# Patient Record
Sex: MALE | Race: WHITE | NOT HISPANIC OR LATINO | Employment: OTHER | URBAN - METROPOLITAN AREA
[De-identification: names, ages, dates, MRNs, and addresses within clinical notes are randomized per-mention and may not be internally consistent; named-entity substitution may affect disease eponyms.]

---

## 2019-01-04 ENCOUNTER — OFFICE VISIT (OUTPATIENT)
Dept: URGENT CARE | Facility: CLINIC | Age: 74
End: 2019-01-04
Payer: MEDICARE

## 2019-01-04 VITALS
WEIGHT: 177 LBS | OXYGEN SATURATION: 100 % | DIASTOLIC BLOOD PRESSURE: 80 MMHG | RESPIRATION RATE: 18 BRPM | HEART RATE: 81 BPM | HEIGHT: 67 IN | BODY MASS INDEX: 27.78 KG/M2 | SYSTOLIC BLOOD PRESSURE: 155 MMHG | TEMPERATURE: 98.3 F

## 2019-01-04 DIAGNOSIS — J01.10 ACUTE NON-RECURRENT FRONTAL SINUSITIS: Primary | ICD-10-CM

## 2019-01-04 DIAGNOSIS — K21.9 GASTROESOPHAGEAL REFLUX DISEASE WITHOUT ESOPHAGITIS: ICD-10-CM

## 2019-01-04 PROCEDURE — 99203 OFFICE O/P NEW LOW 30 MIN: CPT | Performed by: PHYSICIAN ASSISTANT

## 2019-01-04 RX ORDER — LISINOPRIL 5 MG/1
5 TABLET ORAL DAILY
Refills: 1 | COMMUNITY
Start: 2018-12-04

## 2019-01-04 RX ORDER — AMOXICILLIN AND CLAVULANATE POTASSIUM 875; 125 MG/1; MG/1
1 TABLET, FILM COATED ORAL EVERY 12 HOURS SCHEDULED
Qty: 14 TABLET | Refills: 0 | Status: SHIPPED | OUTPATIENT
Start: 2019-01-04 | End: 2019-01-11

## 2019-01-04 RX ORDER — RANITIDINE 150 MG/1
150 TABLET ORAL 2 TIMES DAILY
Qty: 30 TABLET | Refills: 0 | Status: SHIPPED | OUTPATIENT
Start: 2019-01-04

## 2019-01-04 RX ORDER — ATORVASTATIN CALCIUM 20 MG/1
20 TABLET, FILM COATED ORAL
Refills: 0 | COMMUNITY
Start: 2018-12-05

## 2019-01-04 RX ORDER — BENZONATATE 200 MG/1
200 CAPSULE ORAL 3 TIMES DAILY PRN
Qty: 20 CAPSULE | Refills: 0 | Status: SHIPPED | OUTPATIENT
Start: 2019-01-04

## 2019-01-04 RX ORDER — GLIMEPIRIDE 4 MG/1
4 TABLET ORAL 2 TIMES DAILY
Refills: 1 | COMMUNITY
Start: 2018-12-05

## 2019-01-04 RX ORDER — RANITIDINE 150 MG/1
150 TABLET ORAL 2 TIMES DAILY
Refills: 0 | COMMUNITY
Start: 2018-10-02 | End: 2019-01-04 | Stop reason: SDUPTHER

## 2019-01-04 NOTE — PATIENT INSTRUCTIONS
Take the antibiotic as directed with food and water  While on this medication begin a probiotic such as yogurt  Take Tessalon as directed  Take ranitidine twice daily as directed  Use a cool mist humidifier at bedtime, turning on hours prior to bed with your bedroom door shut for maximum relief  Follow up with your family doctor in 3-5 days  Proceed to the ER if symptoms worsen

## 2019-01-04 NOTE — PROGRESS NOTES
[id number] [facility]        NAME: Yaz Puentes is a 68 y o  male  : 1945    MRN: [id number]  DATE: 2019  TIME: 6:24 PM    Assessment and Plan   Acute non-recurrent frontal sinusitis [J01 10]  1  Acute non-recurrent frontal sinusitis  amoxicillin-clavulanate (AUGMENTIN) 875-125 mg per tablet    benzonatate (TESSALON) 200 MG capsule   2  Gastroesophageal reflux disease without esophagitis  ranitidine (ZANTAC) 150 mg tablet     Patient Instructions   Take the antibiotic as directed with food and water  While on this medication begin a probiotic such as yogurt  Take Tessalon as directed  Use a cool mist humidifier at bedtime, turning on hours prior to bed with your bedroom door shut for maximum relief  Follow up with your family doctor in 3-5 days  Proceed to the ER if symptoms worsen  Diagnosis, etiology, expected course of illness, and treatment plan reviewed in length  All questions answered  Precautions given  Patient verbalized understanding and agreed with the treatment plan  Chief Complaint     Chief Complaint   Patient presents with    URI     Pt reports of worsening URI s/s  started approx 2 weeks ago  History of Present Illness   69 y/o with with c/o sinus congestion x 2 weeks  He notes symptoms of intermittent bilateral ear pressure, nasal congestion, runny nose, postnasal drainage, mild throat irritation, and occasional dry cough, and sinus pressure  Pain was mild at onset but has been increasing over the past week  He is currently visiting his daughter for the holidays with multiple sick contacts at home  He notes that he takes ranitidine twice daily for chronic reflux  He is requesting refill of this medication noting that he just ran out and is out of town and thus cannot receive a refill from his family physician  Review of Systems   Review of Systems   Constitutional: Negative for appetite change, chills, diaphoresis, fatigue and fever     Respiratory: Negative for shortness of breath and wheezing  Cardiovascular: Negative for chest pain and palpitations  Gastrointestinal: Negative for abdominal pain, diarrhea, nausea and vomiting  Musculoskeletal: Negative for arthralgias and myalgias  Skin: Negative for rash  Neurological: Negative for dizziness, light-headedness and headaches  Current Medications       Current Outpatient Prescriptions:     atorvastatin (LIPITOR) 20 mg tablet, Take 20 mg by mouth daily at bedtime, Disp: , Rfl: 0    glimepiride (AMARYL) 4 mg tablet, Take 4 mg by mouth 2 (two) times a day, Disp: , Rfl: 1    lisinopril (ZESTRIL) 5 mg tablet, Take 5 mg by mouth daily, Disp: , Rfl: 1    metFORMIN (GLUCOPHAGE) 500 mg tablet, Take 500 mg by mouth 2 (two) times a day with meals, Disp: , Rfl:     ranitidine (ZANTAC) 150 mg tablet, Take 1 tablet (150 mg total) by mouth 2 (two) times a day, Disp: 30 tablet, Rfl: 0    amoxicillin-clavulanate (AUGMENTIN) 875-125 mg per tablet, Take 1 tablet by mouth every 12 (twelve) hours for 7 days, Disp: 14 tablet, Rfl: 0    benzonatate (TESSALON) 200 MG capsule, Take 1 capsule (200 mg total) by mouth 3 (three) times a day as needed for cough, Disp: 20 capsule, Rfl: 0    Current Allergies     Allergies as of 01/04/2019    (No Known Allergies)          The following portions of the patient's history were reviewed and updated as appropriate: allergies, current medications, past family history, past medical history, past social history, past surgical history and problem list      Past Medical History:   Diagnosis Date    Diabetes mellitus ([address] )     GERD (gastroesophageal reflux disease)     Hypertension        Past Surgical History:   Procedure Laterality Date    APPENDECTOMY       History reviewed  No pertinent family history  Medications have been verified      Objective   /80   Pulse 81   Temp 98 3 °F (36 8 °C)   Resp 18   Ht 5' 7" (1 702 m)   Wt 80 3 kg (177 lb)   SpO2 100%   BMI 27 72 kg/m²      Physical Exam     Physical Exam   Constitutional: He is oriented to person, place, and time  Vital signs are normal  He appears well-developed and well-nourished  He is cooperative  He appears ill  No distress  HENT:   Head: Normocephalic and atraumatic  Right Ear: Hearing, external ear and ear canal normal  A middle ear effusion is present  Left Ear: Hearing, external ear and ear canal normal  A middle ear effusion is present  Nose: Mucosal edema and rhinorrhea present  Mouth/Throat: Uvula is midline, oropharynx is clear and moist and mucous membranes are normal  Mucous membranes are not pale, not dry and not cyanotic  No oropharyngeal exudate, posterior oropharyngeal edema, posterior oropharyngeal erythema or tonsillar abscesses  PND present  Eyes: Conjunctivae are normal  Right eye exhibits no discharge  Left eye exhibits no discharge  No scleral icterus  Neck: Full passive range of motion without pain and phonation normal  Neck supple  No neck rigidity  No edema and no erythema present  Cardiovascular: Normal rate, regular rhythm and normal heart sounds  Exam reveals no gallop and no friction rub  No murmur heard  Pulmonary/Chest: Effort normal and breath sounds normal  No stridor  No respiratory distress  He has no decreased breath sounds  He has no wheezes  He has no rhonchi  He has no rales  Abdominal: Soft  Bowel sounds are normal  He exhibits no distension  There is no tenderness  Lymphadenopathy:     He has cervical adenopathy  Neurological: He is alert and oriented to person, place, and time  He has normal reflexes  No cranial nerve deficit  He exhibits normal muscle tone  Coordination normal    Skin: Skin is warm and dry  No rash noted  He is not diaphoretic  Psychiatric: He has a normal mood and affect  His behavior is normal    Nursing note and vitals reviewed